# Patient Record
Sex: FEMALE | Race: WHITE | NOT HISPANIC OR LATINO
[De-identification: names, ages, dates, MRNs, and addresses within clinical notes are randomized per-mention and may not be internally consistent; named-entity substitution may affect disease eponyms.]

---

## 2017-05-04 ENCOUNTER — APPOINTMENT (OUTPATIENT)
Dept: INTERNAL MEDICINE | Facility: CLINIC | Age: 57
End: 2017-05-04

## 2017-05-04 VITALS
SYSTOLIC BLOOD PRESSURE: 118 MMHG | WEIGHT: 90 LBS | DIASTOLIC BLOOD PRESSURE: 78 MMHG | HEIGHT: 57 IN | BODY MASS INDEX: 19.41 KG/M2 | OXYGEN SATURATION: 99 %

## 2017-05-04 DIAGNOSIS — R91.1 SOLITARY PULMONARY NODULE: ICD-10-CM

## 2017-05-04 DIAGNOSIS — J44.9 CHRONIC OBSTRUCTIVE PULMONARY DISEASE, UNSPECIFIED: ICD-10-CM

## 2017-05-04 DIAGNOSIS — I34.1 NONRHEUMATIC MITRAL (VALVE) PROLAPSE: ICD-10-CM

## 2022-01-07 ENCOUNTER — APPOINTMENT (OUTPATIENT)
Dept: ULTRASOUND IMAGING | Facility: CLINIC | Age: 62
End: 2022-01-07

## 2022-01-07 ENCOUNTER — APPOINTMENT (OUTPATIENT)
Dept: MAMMOGRAPHY | Facility: CLINIC | Age: 62
End: 2022-01-07

## 2022-10-08 ENCOUNTER — NON-APPOINTMENT (OUTPATIENT)
Age: 62
End: 2022-10-08

## 2023-07-22 ENCOUNTER — NON-APPOINTMENT (OUTPATIENT)
Age: 63
End: 2023-07-22

## 2023-09-19 ENCOUNTER — APPOINTMENT (OUTPATIENT)
Dept: ORTHOPEDIC SURGERY | Facility: CLINIC | Age: 63
End: 2023-09-19
Payer: COMMERCIAL

## 2023-09-19 VITALS — HEIGHT: 57 IN | BODY MASS INDEX: 19.41 KG/M2 | WEIGHT: 90 LBS

## 2023-09-19 DIAGNOSIS — M51.34 OTHER INTERVERTEBRAL DISC DEGENERATION, THORACIC REGION: ICD-10-CM

## 2023-09-19 DIAGNOSIS — M54.9 DORSALGIA, UNSPECIFIED: ICD-10-CM

## 2023-09-19 DIAGNOSIS — M54.6 PAIN IN THORACIC SPINE: ICD-10-CM

## 2023-09-19 DIAGNOSIS — M47.812 SPONDYLOSIS W/OUT MYELOPATHY OR RADICULOPATHY, CERVICAL REGION: ICD-10-CM

## 2023-09-19 PROCEDURE — 72070 X-RAY EXAM THORAC SPINE 2VWS: CPT

## 2023-09-19 PROCEDURE — 99203 OFFICE O/P NEW LOW 30 MIN: CPT

## 2023-09-19 PROCEDURE — 72050 X-RAY EXAM NECK SPINE 4/5VWS: CPT

## 2023-12-10 ENCOUNTER — NON-APPOINTMENT (OUTPATIENT)
Age: 63
End: 2023-12-10

## 2023-12-16 ENCOUNTER — NON-APPOINTMENT (OUTPATIENT)
Age: 63
End: 2023-12-16

## 2024-02-13 ENCOUNTER — NON-APPOINTMENT (OUTPATIENT)
Age: 64
End: 2024-02-13

## 2024-07-30 ENCOUNTER — APPOINTMENT (OUTPATIENT)
Dept: ORTHOPEDIC SURGERY | Facility: CLINIC | Age: 64
End: 2024-07-30

## 2024-07-30 DIAGNOSIS — M22.41 CHONDROMALACIA PATELLAE, RIGHT KNEE: ICD-10-CM

## 2024-07-30 PROCEDURE — 73562 X-RAY EXAM OF KNEE 3: CPT | Mod: RT

## 2024-07-30 PROCEDURE — 99213 OFFICE O/P EST LOW 20 MIN: CPT

## 2024-07-30 RX ORDER — NAPROXEN 500 MG/1
500 TABLET ORAL
Qty: 30 | Refills: 1 | Status: ACTIVE | COMMUNITY
Start: 2024-07-30 | End: 1900-01-01

## 2024-07-30 NOTE — HISTORY OF PRESENT ILLNESS
[Dull/Aching] : dull/aching [Localized] : localized [Sharp] : sharp [Intermittent] : intermittent [Nothing helps with pain getting better] : Nothing helps with pain getting better [Walking] : walking [de-identified] : 07/30/24 :DOMINIQUE PANDYA , a64 year old female, presents today for right knee pain, pt states last 2 weeks noticed a sharp discomforting pain, states sitting / excessive walking for too long cause pain / states knee swells up often. uses Advil / tropic cream. slight pain behind knee. no prior injury / states she fell on knee 2019 /  [] : no

## 2024-07-30 NOTE — DISCUSSION/SUMMARY
[de-identified] : Discussed options, Naproxen 500mg BID 4-5 Start Physical Therapy   f/u 4-6 weeks ----------------------------------------------------------------------------  Patient warned of specific risks of medication related to bleeding, GI issues, increase blood pressure, and cardiac risks in addition to additional risks.  Patient advised to discuss with PMD  if any presence of stated issues.  ----------------------------------------------------------------------------   All relevant imaging studies pertinent to today's visit, including x-rays, MRI's and/or other advanced imaging studies (CT/etc) were independently interpreted and reviewed with the patient as needed. Implications of the studies together with the patient's clinical picture were discussed to formulate a working diagnosis and management options were detailed.   The patient and/or guardian was advised of the diagnosis.  The natural history of the pathology was explained in full. All questions were answered.  The risks and benefits of conservative and interventional treatment alternatives were explained to the patient  The patient and/or guardian was advised if any advanced diagnostic/imaging study (MRI/CT/etc) is ordered to evaluate potential pathology in the affected area(s), they should follow up in the office to review the results of the study and determine further management that may be indicated.

## 2024-07-30 NOTE — IMAGING
[Right] : right knee [All Views] : anteroposterior, lateral, skyline, and anteroposterior standing [There are no fractures, subluxations or dislocations. No significant abnormalities are seen] : There are no fractures, subluxations or dislocations. No significant abnormalities are seen [de-identified] :   ----------------------------------------------------------------------------   Right knee exam:   Skin: no significant pertinent finding  Inspection:     (neg) Effusion     (neg) Malalignment     (neg) Swelling     (neg) Quad atrophy     (neg) J-sign  ROM:     0 - 135 degrees of flexion.  Tenderness:     (+) MJLT     (neg) LJLT     (+) Medial patellar facet tenderness     (+) Lateral patellar facet tenderness     (neg) Crepitus     (neg) Patellar grind tenderness     (neg) Patellar tendon     (neg) Quad tendon     Other:  Stability:     (neg) Lachman     (neg) Varus/Valgus instability     (neg) Posterior drawer     (neg) Patellar translation: wnl  Additional tests:     (neg) McMurrays test     (neg) Patellar apprehension     Other:  Strength: 5/5 Q/H/TA/GS/EHL  Neuro: In tact to light touch throughout, DTR's wnl  Vascularity: Extremity warm and well perfused  Gait: normal